# Patient Record
Sex: FEMALE | Race: WHITE
[De-identification: names, ages, dates, MRNs, and addresses within clinical notes are randomized per-mention and may not be internally consistent; named-entity substitution may affect disease eponyms.]

---

## 2017-06-08 ENCOUNTER — HOSPITAL ENCOUNTER (EMERGENCY)
Dept: HOSPITAL 54 - ER | Age: 27
Discharge: HOME | End: 2017-06-08
Payer: COMMERCIAL

## 2017-06-08 VITALS — DIASTOLIC BLOOD PRESSURE: 85 MMHG | SYSTOLIC BLOOD PRESSURE: 116 MMHG

## 2017-06-08 VITALS — WEIGHT: 130 LBS | HEIGHT: 67 IN | BODY MASS INDEX: 20.4 KG/M2
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
## 2017-06-08 DIAGNOSIS — S70.11XA: ICD-10-CM

## 2017-06-08 DIAGNOSIS — R07.89: Primary | ICD-10-CM

## 2017-06-08 PROCEDURE — Z7610: HCPCS

## 2017-06-08 PROCEDURE — A4606 OXYGEN PROBE USED W OXIMETER: HCPCS

## 2017-06-08 RX ADMIN — ACETAMINOPHEN ONE MG: 500 TABLET ORAL at 15:30

## 2017-06-08 NOTE — NUR
PT C/O MIDSTERNAL CP S/P PLAYING BASKETBALL 2 DAYS AGO, PT HAS A BRUISE ON R 
LEG. CONCERNED THAT IT MIGHT BE A BLOOD CLOT SO SHE WENT HERE. NAD NOTED. 
[id number]. SEEN BY MD FOR EVAL. VSS. SAFETY AND COMFORT MEASURES PROVIDED. WILL 
MONITOR.

## 2018-08-06 ENCOUNTER — OFFICE (OUTPATIENT)
Dept: URBAN - METROPOLITAN AREA CLINIC 66 | Facility: CLINIC | Age: 28
End: 2018-08-06

## 2018-08-06 VITALS — HEIGHT: 67 IN | SYSTOLIC BLOOD PRESSURE: 102 MMHG | WEIGHT: 129 LBS | DIASTOLIC BLOOD PRESSURE: 78 MMHG

## 2018-08-06 DIAGNOSIS — K59.00 CONSTIPATION: ICD-10-CM

## 2018-08-06 DIAGNOSIS — R10.13 EPIGASTRIC PAIN: ICD-10-CM

## 2018-08-06 PROCEDURE — 99242 OFF/OP CONSLTJ NEW/EST SF 20: CPT | Performed by: INTERNAL MEDICINE

## 2018-08-06 NOTE — SERVICEHPINOTES
The patient is a an 29 yo female referred for evaluation of epigastric pain, nausea, and constipation.  She's had IBS and cystitis symptoms for 10 years, and has been better since she was gluten free approximately 7 years ago--an EGD in 2012 was negative.  Three months ago she had an escalation of epigastric discomfort, nausea, and constipation.  The constipation has resolved with diet.  She does take Ibuprofen on a near daily basis she is not on H2RA nor PPI therapy.  She's had no alarm symptoms.

## 2020-01-11 ENCOUNTER — HOSPITAL ENCOUNTER (EMERGENCY)
Facility: MEDICAL CENTER | Age: 30
End: 2020-01-11
Attending: EMERGENCY MEDICINE
Payer: COMMERCIAL

## 2020-01-11 VITALS
OXYGEN SATURATION: 98 % | WEIGHT: 138.89 LBS | SYSTOLIC BLOOD PRESSURE: 110 MMHG | BODY MASS INDEX: 21.8 KG/M2 | DIASTOLIC BLOOD PRESSURE: 78 MMHG | HEART RATE: 86 BPM | TEMPERATURE: 97.6 F | RESPIRATION RATE: 16 BRPM | HEIGHT: 67 IN

## 2020-01-11 DIAGNOSIS — B09 VIRAL RASH: ICD-10-CM

## 2020-01-11 DIAGNOSIS — H92.01 RIGHT EAR PAIN: ICD-10-CM

## 2020-01-11 PROCEDURE — 99283 EMERGENCY DEPT VISIT LOW MDM: CPT

## 2020-01-11 RX ORDER — FAMCICLOVIR 500 MG/1
500 TABLET ORAL 3 TIMES DAILY
Qty: 21 TAB | Refills: 0 | Status: SHIPPED | OUTPATIENT
Start: 2020-01-11

## 2020-01-11 RX ORDER — CLINDAMYCIN HYDROCHLORIDE 300 MG/1
300 CAPSULE ORAL 3 TIMES DAILY
Qty: 40 CAP | Refills: 0 | Status: SHIPPED | OUTPATIENT
Start: 2020-01-11

## 2020-01-11 SDOH — HEALTH STABILITY: MENTAL HEALTH: HOW OFTEN DO YOU HAVE A DRINK CONTAINING ALCOHOL?: NEVER

## 2020-01-11 ASSESSMENT — PAIN DESCRIPTION - DESCRIPTORS: DESCRIPTORS: ACHING;PRESSURE

## 2020-01-11 NOTE — ED PROVIDER NOTES
"ED Provider Note    ED Provider    Means of arrival: Private vehicle  History obtained from: Patient  History limited by: None    CHIEF COMPLAINT  Chief Complaint   Patient presents with   • Earache   • Blisters       HPI  Sia Thompson is a 29 y.o. female who presents complaints of right ear pain.  Is been ongoing for 2 days.  She had a bump there and now she is developed blisters to the right ear.  She also has a blister on the tongue.  She said no fevers chills or sweats, no cough congestion or flulike symptoms no nausea vomiting or diarrhea.  She has no hearing loss.  No headache.  No visual disturbance.    REVIEW OF SYSTEMS  See HPI for further details. All other systems are negative.     PAST MEDICAL HISTORY       SOCIAL HISTORY  Social History     Tobacco Use   • Smoking status: Never Smoker   • Smokeless tobacco: Never Used   Substance and Sexual Activity   • Alcohol use: Yes     Frequency: Never     Comment: Occasionally   • Drug use: Never   • Sexual activity: Not on file       SURGICAL HISTORY  patient denies any surgical history    CURRENT MEDICATIONS  Home Medications    **Home medications have not yet been reviewed for this encounter**         ALLERGIES  Allergies   Allergen Reactions   • Dairy Food Allergy    • Gluten Meal        PHYSICAL EXAM  VITAL SIGNS: /78   Pulse 86   Temp 36.4 °C (97.6 °F) (Temporal)   Resp 16   Ht 1.702 m (5' 7\")   Wt 63 kg (138 lb 14.2 oz)   LMP 12/21/2019 (Approximate)   SpO2 98%   BMI 21.75 kg/m²   Constitutional: Alert in no apparent distress.  HENT: No signs of trauma, Mucous membranes are moist right ear pain has vesicles, erythema mild swelling and tenderness.  External auditory canal is normal, tympanic membrane is normal.  Left ear externally and medially are normal.    There is a vesicle to the right distal tongue no swelling.    There is no preauricular edema, swelling, or tenderness.  And mastoid is not tender  Eyes:  Conjunctiva normal, " Non-icteric.   Neck: Normal range of motion, No tenderness, Supple,  Lymphatic: No lymphadenopathy noted.   Cardiovascular: Regular rate and rhythm, no murmurs.   Thorax & Lungs: Normal breath sounds, No respiratory distress, No wheezing, No chest tenderness.   Abdomen: Bowel sounds normal, Soft, No tenderness, No masses, No pulsatile masses. No peritoneal signs.  Skin: Warm, Dry,Normal color  Back: No bony tenderness, No CVA tenderness.   Extremities:No edema, No tenderness, No cyanosis,    Musculoskeletal: Good range of motion in all major joints. No tenderness to palpation or major deformities noted.   Neurologic: Alert ,Oriented x4, Normal motor function, Normal sensory function, No focal deficits noted.   Psychiatric: Affect normal, Judgment normal, Mood normal.       MEDICAL DECISION MAKING  This is a 29 y.o. female who presents with a vesicular rash to the right ear and the tongue, this is not in a dermatomal distribution so shingles is not likely.  Does appear to be may be a herpetic rash of some sort.  Due to the location and the redness of the ear and swelling I have concerns that may be a secondary cellulitis with this.  She will be placed on antiviral medication along with antibiotics.  She is instructed to return if her symptoms change or worsen    Mastoid is not tender and suspect mastoid tenderness.  And there are no other lesions of the face.  DIAGNOSTIC STUDIES / PROCEDURES    EKG      LABS      RADIOLOGY  No orders to display       COURSE  Pertinent Labs & Imaging studies reviewed. (See chart for details)    12:11 PM - Patient seen and examined at bedside. Discussed plan of care,  FINAL IMPRESSION  1. Right ear pain    2. Viral rash

## 2020-01-11 NOTE — ED TRIAGE NOTES
"Presents accompanied by family.  Pt C/O worsening right ear canal pain recurring since this past Tuesday.  Small, dispersed blisters are observed.  Chief Complaint   Patient presents with   • Earache   • Blisters       /78   Pulse 86   Temp 36.4 °C (97.6 °F) (Temporal)   Resp 16   Ht 1.702 m (5' 7\")   Wt 63 kg (138 lb 14.2 oz)   LMP 12/21/2019 (Approximate)   SpO2 98%   BMI 21.75 kg/m²     "

## 2020-01-11 NOTE — DISCHARGE INSTRUCTIONS
This appears to be a viral illness, something similar to shingles but does not fit the normal pattern for this.  There may also be a secondary infection you are being placed on a antiviral medication along with an antibiotic.  Use Motrin Tylenol for pain, return if symptoms change or worsen